# Patient Record
Sex: MALE | Race: WHITE | Employment: FULL TIME | URBAN - METROPOLITAN AREA
[De-identification: names, ages, dates, MRNs, and addresses within clinical notes are randomized per-mention and may not be internally consistent; named-entity substitution may affect disease eponyms.]

---

## 2021-02-04 ENCOUNTER — APPOINTMENT (OUTPATIENT)
Dept: GENERAL RADIOLOGY | Age: 46
End: 2021-02-04
Payer: COMMERCIAL

## 2021-02-04 ENCOUNTER — HOSPITAL ENCOUNTER (EMERGENCY)
Age: 46
Discharge: HOME | End: 2021-02-04
Attending: EMERGENCY MEDICINE
Payer: COMMERCIAL

## 2021-02-04 VITALS
TEMPERATURE: 98.4 F | OXYGEN SATURATION: 95 % | SYSTOLIC BLOOD PRESSURE: 141 MMHG | DIASTOLIC BLOOD PRESSURE: 72 MMHG | RESPIRATION RATE: 19 BRPM | BODY MASS INDEX: 31.83 KG/M2 | HEIGHT: 68 IN | HEART RATE: 82 BPM | WEIGHT: 210 LBS

## 2021-02-04 DIAGNOSIS — R55 NEAR SYNCOPE: ICD-10-CM

## 2021-02-04 DIAGNOSIS — R07.9 CHEST PAIN, UNSPECIFIED TYPE: ICD-10-CM

## 2021-02-04 DIAGNOSIS — J18.9 PNEUMONIA OF BOTH LUNGS DUE TO INFECTIOUS ORGANISM, UNSPECIFIED PART OF LUNG: Primary | ICD-10-CM

## 2021-02-04 LAB
ANION GAP SERPL CALCULATED.3IONS-SCNC: 13 MMOL/L (ref 7–16)
BUN BLDV-MCNC: 15 MG/DL (ref 6–20)
CALCIUM SERPL-MCNC: 8.9 MG/DL (ref 8.6–10.2)
CHLORIDE BLD-SCNC: 103 MMOL/L (ref 98–107)
CO2: 25 MMOL/L (ref 22–29)
CREAT SERPL-MCNC: 0.8 MG/DL (ref 0.7–1.2)
D DIMER: <200 NG/ML DDU
EKG ATRIAL RATE: 82 BPM
EKG P AXIS: 59 DEGREES
EKG P-R INTERVAL: 152 MS
EKG Q-T INTERVAL: 354 MS
EKG QRS DURATION: 86 MS
EKG QTC CALCULATION (BAZETT): 413 MS
EKG R AXIS: 21 DEGREES
EKG T AXIS: 35 DEGREES
EKG VENTRICULAR RATE: 82 BPM
GFR AFRICAN AMERICAN: >60
GFR NON-AFRICAN AMERICAN: >60 ML/MIN/1.73
GLUCOSE BLD-MCNC: 93 MG/DL (ref 74–99)
HCT VFR BLD CALC: 44.3 % (ref 37–54)
HEMOGLOBIN: 15.4 G/DL (ref 12.5–16.5)
MCH RBC QN AUTO: 30.1 PG (ref 26–35)
MCHC RBC AUTO-ENTMCNC: 34.8 % (ref 32–34.5)
MCV RBC AUTO: 86.7 FL (ref 80–99.9)
PDW BLD-RTO: 11 FL (ref 11.5–15)
PLATELET # BLD: 215 E9/L (ref 130–450)
PMV BLD AUTO: 11.4 FL (ref 7–12)
POTASSIUM SERPL-SCNC: 3.6 MMOL/L (ref 3.5–5)
RBC # BLD: 5.11 E12/L (ref 3.8–5.8)
SARS-COV-2, NAAT: NOT DETECTED
SODIUM BLD-SCNC: 141 MMOL/L (ref 132–146)
TROPONIN: <0.01 NG/ML (ref 0–0.03)
WBC # BLD: 7.5 E9/L (ref 4.5–11.5)

## 2021-02-04 PROCEDURE — 84484 ASSAY OF TROPONIN QUANT: CPT

## 2021-02-04 PROCEDURE — 85027 COMPLETE CBC AUTOMATED: CPT

## 2021-02-04 PROCEDURE — 85378 FIBRIN DEGRADE SEMIQUANT: CPT

## 2021-02-04 PROCEDURE — 99283 EMERGENCY DEPT VISIT LOW MDM: CPT

## 2021-02-04 PROCEDURE — U0002 COVID-19 LAB TEST NON-CDC: HCPCS

## 2021-02-04 PROCEDURE — 6370000000 HC RX 637 (ALT 250 FOR IP): Performed by: EMERGENCY MEDICINE

## 2021-02-04 PROCEDURE — 71045 X-RAY EXAM CHEST 1 VIEW: CPT

## 2021-02-04 PROCEDURE — 80048 BASIC METABOLIC PNL TOTAL CA: CPT

## 2021-02-04 PROCEDURE — 36415 COLL VENOUS BLD VENIPUNCTURE: CPT

## 2021-02-04 PROCEDURE — 93005 ELECTROCARDIOGRAM TRACING: CPT | Performed by: EMERGENCY MEDICINE

## 2021-02-04 RX ORDER — AMOXICILLIN 500 MG/1
500 CAPSULE ORAL EVERY 6 HOURS
COMMUNITY

## 2021-02-04 RX ORDER — VALSARTAN 80 MG/1
80 TABLET ORAL DAILY
COMMUNITY

## 2021-02-04 RX ORDER — AZITHROMYCIN 250 MG/1
TABLET, FILM COATED ORAL
Qty: 1 PACKET | Refills: 0 | Status: SHIPPED | OUTPATIENT
Start: 2021-02-04 | End: 2021-02-14

## 2021-02-04 RX ORDER — AZITHROMYCIN 250 MG/1
500 TABLET, FILM COATED ORAL ONCE
Status: COMPLETED | OUTPATIENT
Start: 2021-02-04 | End: 2021-02-04

## 2021-02-04 RX ADMIN — AZITHROMYCIN MONOHYDRATE 500 MG: 250 TABLET ORAL at 04:13

## 2021-02-04 ASSESSMENT — PAIN DESCRIPTION - ONSET: ONSET: ON-GOING

## 2021-02-04 ASSESSMENT — ENCOUNTER SYMPTOMS
DIARRHEA: 0
COUGH: 1
EYE REDNESS: 0
SHORTNESS OF BREATH: 1
BACK PAIN: 0
SORE THROAT: 0
NAUSEA: 0
EYE DISCHARGE: 0
ABDOMINAL PAIN: 0
EYE PAIN: 0
VOMITING: 0
WHEEZING: 0
SINUS PRESSURE: 0

## 2021-02-04 ASSESSMENT — PAIN DESCRIPTION - FREQUENCY: FREQUENCY: CONTINUOUS

## 2021-02-04 ASSESSMENT — PAIN DESCRIPTION - ORIENTATION: ORIENTATION: LEFT;MID

## 2021-02-04 ASSESSMENT — HEART SCORE: ECG: 0

## 2021-02-04 ASSESSMENT — PAIN DESCRIPTION - PAIN TYPE: TYPE: ACUTE PAIN

## 2021-02-04 NOTE — ED PROVIDER NOTES
abdominal tenderness. There is no guarding. Musculoskeletal: Normal range of motion. Skin:     General: Skin is warm and dry. Neurological:      Mental Status: He is alert and oriented to person, place, and time. Procedures     MDM           EKG:  Normal sinus rhythm with ventricular rate of 82. NE interval, QRS duration and QT interval within normal range. Normal axis. No ST segment abnormalities to suggest acute ischemia. Time: 6693. Re-evaluation. Patients symptoms are improving  Repeat physical examination is not changed  Patient is feeling much better. --------------------------------------------- PAST HISTORY ---------------------------------------------  Past Medical History:  has a past medical history of Asthma and Hypertension. Past Surgical History:  has a past surgical history that includes knee surgery and hernia repair. Social History:  reports that he has been smoking. He has been smoking about 0.50 packs per day. He has never used smokeless tobacco. He reports current alcohol use. He reports that he does not use drugs. Family History: family history is not on file. The patients home medications have been reviewed. Allergies: Patient has no known allergies.     -------------------------------------------------- RESULTS -------------------------------------------------  Labs:  Results for orders placed or performed during the hospital encounter of 62/23/38   Basic metabolic panel   Result Value Ref Range    Sodium 141 132 - 146 mmol/L    Potassium 3.6 3.5 - 5.0 mmol/L    Chloride 103 98 - 107 mmol/L    CO2 25 22 - 29 mmol/L    Anion Gap 13 7 - 16 mmol/L    Glucose 93 74 - 99 mg/dL    BUN 15 6 - 20 mg/dL    CREATININE 0.8 0.7 - 1.2 mg/dL    GFR Non-African American >60 >=60 mL/min/1.73    GFR African American >60     Calcium 8.9 8.6 - 10.2 mg/dL   CBC   Result Value Ref Range    WBC 7.5 4.5 - 11.5 E9/L    RBC 5.11 3.80 - 5.80 E12/L    Hemoglobin 15.4 12.5 The plan has been discussed in detail and they are aware of the specific conditions for emergent return, as well as the importance of follow-up. New Prescriptions    AZITHROMYCIN (ZITHROMAX Z-MAURISIO) 250 MG TABLET    TAKE 500MG PO DAY ONE. .. 250MG PO DAY TWO THROUGH FIVE   DISPENSE 6 TABS  NO REFILLS       Diagnosis:  1. Pneumonia of both lungs due to infectious organism, unspecified part of lung    2. Chest pain, unspecified type    3. Near syncope        Disposition:  Patient's disposition: Discharge to home  Patient's condition is stable.          Yolande Estrada DO  02/04/21 5453

## 2021-02-04 NOTE — ED NOTES
Blood specimens obtained and sent. NAD noted. Will continue to monitor.         Kathy Caba RN  02/04/21 0643

## 2023-07-14 ENCOUNTER — APPOINTMENT (OUTPATIENT)
Dept: GENERAL RADIOLOGY | Age: 48
End: 2023-07-14
Payer: COMMERCIAL

## 2023-07-14 ENCOUNTER — HOSPITAL ENCOUNTER (EMERGENCY)
Age: 48
Discharge: HOME | End: 2023-07-14
Payer: COMMERCIAL

## 2023-07-14 VITALS
WEIGHT: 210 LBS | RESPIRATION RATE: 20 BRPM | DIASTOLIC BLOOD PRESSURE: 86 MMHG | HEART RATE: 90 BPM | BODY MASS INDEX: 31.93 KG/M2 | SYSTOLIC BLOOD PRESSURE: 141 MMHG | OXYGEN SATURATION: 99 % | TEMPERATURE: 98.4 F

## 2023-07-14 DIAGNOSIS — S83.411A SPRAIN OF MEDIAL COLLATERAL LIGAMENT OF RIGHT KNEE, INITIAL ENCOUNTER: Primary | ICD-10-CM

## 2023-07-14 DIAGNOSIS — M23.91 INTERNAL DERANGEMENT OF RIGHT KNEE: ICD-10-CM

## 2023-07-14 PROCEDURE — 6370000000 HC RX 637 (ALT 250 FOR IP): Performed by: PHYSICIAN ASSISTANT

## 2023-07-14 PROCEDURE — 73562 X-RAY EXAM OF KNEE 3: CPT

## 2023-07-14 PROCEDURE — 99283 EMERGENCY DEPT VISIT LOW MDM: CPT

## 2023-07-14 RX ORDER — IBUPROFEN 600 MG/1
600 TABLET ORAL ONCE
Status: COMPLETED | OUTPATIENT
Start: 2023-07-14 | End: 2023-07-14

## 2023-07-14 RX ADMIN — IBUPROFEN 600 MG: 600 TABLET, FILM COATED ORAL at 13:55

## 2023-07-14 ASSESSMENT — PAIN DESCRIPTION - ORIENTATION: ORIENTATION: RIGHT

## 2023-07-14 ASSESSMENT — PAIN SCALES - GENERAL: PAINLEVEL_OUTOF10: 2

## 2023-07-14 ASSESSMENT — LIFESTYLE VARIABLES: HOW OFTEN DO YOU HAVE A DRINK CONTAINING ALCOHOL: 4 OR MORE TIMES A WEEK

## 2023-07-14 ASSESSMENT — PAIN DESCRIPTION - LOCATION: LOCATION: KNEE

## 2023-07-14 ASSESSMENT — PAIN - FUNCTIONAL ASSESSMENT: PAIN_FUNCTIONAL_ASSESSMENT: 0-10

## 2023-07-14 NOTE — ED NOTES
Patient discharged, all questions answered, all medications and pain management discussed. Patient will return if any problems arise.         Zeynep York RN  07/14/23 6779

## 2023-07-14 NOTE — ED PROVIDER NOTES
Independent DAYAMI Visit. [facility],6[address] Floor  ED  Encounter Note  Admit Date/RoomTime: 2023  1:40 PM  ED Room: Internal Waiting/IntWait  NAME: Eliezer Kincaid  : 1975  MRN: [id number]  PCP: [id number]     CHIEF COMPLAINT     Knee Pain (Hx x 1 day/ felt pop in rt knee yesterday)    HISTORY OF PRESENT ILLNESS        Eliezer Kincaid is a 46 y.o. male who presents to the ED by private vehicle for right knee pain, beginning a few day(s) ago. The complaint has been persistent and are moderate in severity. The patient states that he generally plays basketball fairly routinely. He reports that a few months ago he started feeling some tightness and discomfort in his right knee while playing. He states that he took a few months off to sort of rest up and allow his knee to improve. Yesterday he returned to playing basketball and states that he felt this tearing or pulling sensation in the right knee. He continued to play and when he came home last night he put ice on his knee with and took some Tylenol and anti-inflammatories. The pain persisted overnight. He describes minimal pain at rest but pretty significant pain with movement of the right knee joint and with ambulation and weightbearing. He feels most of the pain along the medial surface of the joint. There is some sense that the knee may give way or buckle    REVIEW OF SYSTEMS     Pertinent positives and negatives are stated within HPI, all other systems reviewed and are negative. Past Medical History:  has a past medical history of Asthma and Hypertension. Surgical History:  has a past surgical history that includes knee surgery; hernia repair; and Knee arthroscopy. Social History:  reports that he has been smoking cigarettes. He has been smoking an average of .5 packs per day. He has never used smokeless tobacco. He reports current alcohol use.  He reports that he does not use

## 2023-07-17 ENCOUNTER — TELEPHONE (OUTPATIENT)
Dept: ORTHOPEDIC SURGERY | Age: 48
End: 2023-07-17

## 2023-07-17 NOTE — TELEPHONE ENCOUNTER
Pt called to schedule a [facility] ED f/u from 7/14 with Dr Jeffrey Al for a rt knee injury. No appts are available until the end of the week. Pt states he was given a RTW date of today. He states his job requires him to stand all day and he will not be able to perform his job.   Please call pt with recommendations [phone]

## 2023-07-18 ENCOUNTER — OFFICE VISIT (OUTPATIENT)
Dept: ORTHOPEDIC SURGERY | Age: 48
End: 2023-07-18

## 2023-07-18 VITALS — WEIGHT: 211 LBS | HEIGHT: 68 IN | BODY MASS INDEX: 31.98 KG/M2 | TEMPERATURE: 98 F

## 2023-07-18 DIAGNOSIS — S83.206A TEAR OF MENISCUS OF RIGHT KNEE, UNSPECIFIED MENISCUS, UNSPECIFIED TEAR TYPE, UNSPECIFIED WHETHER OLD OR CURRENT TEAR: Primary | ICD-10-CM

## 2023-07-18 DIAGNOSIS — M23.51 RECURRENT RIGHT KNEE INSTABILITY: ICD-10-CM

## 2023-07-18 RX ORDER — NAPROXEN 500 MG/1
500 TABLET ORAL 2 TIMES DAILY WITH MEALS
Qty: 60 TABLET | Refills: 0 | Status: SHIPPED | OUTPATIENT
Start: 2023-07-18 | End: 2023-08-17

## 2023-07-18 RX ORDER — OMEPRAZOLE 20 MG/1
20 CAPSULE, DELAYED RELEASE ORAL
Qty: 60 CAPSULE | Refills: 0 | Status: SHIPPED | OUTPATIENT
Start: 2023-07-18

## 2023-07-18 NOTE — PROGRESS NOTES
KNEE RIGHT WO CONTRAST; Future    Recurrent right knee instability    Other orders  -     naproxen (NAPROSYN) 500 MG tablet; Take 1 tablet by mouth 2 times daily (with meals)  -     omeprazole (PRILOSEC) 20 MG delayed release capsule; Take 1 capsule by mouth 2 times daily (before meals)        Patient seen and examined. X-rays reviewed. Patient sustained a noncontact twisting injury to the right knee. Patient's main complaint is instability of symptomatic knee. Exam and history is consistent with possible meniscus tear and other possible internal derangement such as ACL tear, patellar dislocation, loose body. MRI recommended for further evaluation management. Follow-up after MRI.          Bharti Toro, DO  7/18/23

## 2023-07-26 ENCOUNTER — HOSPITAL ENCOUNTER (OUTPATIENT)
Dept: MRI IMAGING | Age: 48
Discharge: HOME | End: 2023-07-28
Payer: COMMERCIAL

## 2023-07-26 DIAGNOSIS — S83.206A TEAR OF MENISCUS OF RIGHT KNEE, UNSPECIFIED MENISCUS, UNSPECIFIED TEAR TYPE, UNSPECIFIED WHETHER OLD OR CURRENT TEAR: ICD-10-CM

## 2023-07-26 PROCEDURE — 73721 MRI JNT OF LWR EXTRE W/O DYE: CPT

## 2023-07-27 ENCOUNTER — TELEPHONE (OUTPATIENT)
Dept: ORTHOPEDIC SURGERY | Age: 48
End: 2023-07-27

## 2023-07-27 NOTE — TELEPHONE ENCOUNTER
Pt called in to [facility] an appt for MRI results. I offered first avail on 8/2/23. He said this is time sensitive, he needs to let his employer know what's going on. Is the pt able to come in before first avail? Please, advise. Harry Juarez can be reached at [phone]. Thank you.

## 2023-07-28 ENCOUNTER — OFFICE VISIT (OUTPATIENT)
Dept: ORTHOPEDIC SURGERY | Age: 48
End: 2023-07-28

## 2023-07-28 VITALS — TEMPERATURE: 98 F | BODY MASS INDEX: 31.98 KG/M2 | WEIGHT: 211 LBS | HEIGHT: 68 IN

## 2023-07-28 DIAGNOSIS — S83.206A TEAR OF MENISCUS OF RIGHT KNEE, UNSPECIFIED MENISCUS, UNSPECIFIED TEAR TYPE, UNSPECIFIED WHETHER OLD OR CURRENT TEAR: Primary | ICD-10-CM

## 2023-07-28 DIAGNOSIS — M23.51 RECURRENT RIGHT KNEE INSTABILITY: ICD-10-CM

## 2023-07-28 NOTE — PROGRESS NOTES
of intravenous contrast. COMPARISON: Radiographs 07/14/2023 HISTORY: ORDERING SYSTEM PROVIDED HISTORY: Tear of meniscus of right knee, unspecified meniscus, unspecified tear type, unspecified whether old or current tear FINDINGS: MENISCI: Complex tear in the posterior horn medial meniscus with vertical component. No significant peripheral extrusion. Intact lateral meniscus with some mucoid degeneration in the anterior horn. CRUCIATE LIGAMENTS: Intact anterior cruciate and posterior cruciate ligaments. EXTENSOR MECHANISM: Intact quadriceps and patellar tendons. Intact patellar retinacula. LATERAL COLLATERAL LIGAMENT COMPLEX: Intact IT band, lateral collateral ligament proper, biceps femoris tendon and popliteus tendon. MEDIAL COLLATERAL LIGAMENT COMPLEX: The superficial and deep components of the medial collateral ligament are intact. KNEE JOINT: Small knee joint effusion. No popliteal cyst.  No high-grade chondromalacia. Mild tricompartmental DJD greatest in the patellofemoral compartment. BONE MARROW: No evidence of fracture. Normal marrow signal.     1. Complex tear of the posterior horn medial meniscus with vertical component but no significant peripheral extrusion. 2. Mucoid degeneration in the anterior horn lateral meniscus. 3. Small knee joint effusion. 4. Mild tricompartmental DJD. Saeedrenee Late was seen today for knee pain. Diagnoses and all orders for this visit:    Tear of meniscus of right knee, unspecified meniscus, unspecified tear type, unspecified whether old or current tear    Recurrent right knee instability        Patient seen and examined. X-rays reviewed. Patient sustained a noncontact twisting injury to the right knee. Patient's main complaint is instability of symptomatic knee. Exam and history is consistent with possible meniscus tear and other possible internal derangement such as ACL tear, patellar dislocation, loose body. MRI recommended for further evaluation management.

## 2023-08-02 ENCOUNTER — TELEPHONE (OUTPATIENT)
Dept: ORTHOPEDIC SURGERY | Age: 48
End: 2023-08-02

## 2023-08-02 NOTE — TELEPHONE ENCOUNTER
LOV 7/28/23 Dr Florinda Bateman next OV 8/11/23. Patient called in complaining of RT knee swelling x 3 days. Patient wanting to speak with clinic for further recommendations.  Please advise patient    [phone]

## 2023-08-09 NOTE — PROGRESS NOTES
performed without the administration of intravenous contrast. COMPARISON: Radiographs 07/14/2023 HISTORY: ORDERING SYSTEM PROVIDED HISTORY: Tear of meniscus of right knee, unspecified meniscus, unspecified tear type, unspecified whether old or current tear FINDINGS: MENISCI: Complex tear in the posterior horn medial meniscus with vertical component. No significant peripheral extrusion. Intact lateral meniscus with some mucoid degeneration in the anterior horn. CRUCIATE LIGAMENTS: Intact anterior cruciate and posterior cruciate ligaments. EXTENSOR MECHANISM: Intact quadriceps and patellar tendons. Intact patellar retinacula. LATERAL COLLATERAL LIGAMENT COMPLEX: Intact IT band, lateral collateral ligament proper, biceps femoris tendon and popliteus tendon. MEDIAL COLLATERAL LIGAMENT COMPLEX: The superficial and deep components of the medial collateral ligament are intact. KNEE JOINT: Small knee joint effusion. No popliteal cyst.  No high-grade chondromalacia. Mild tricompartmental DJD greatest in the patellofemoral compartment. BONE MARROW: No evidence of fracture. Normal marrow signal.     1. Complex tear of the posterior horn medial meniscus with vertical component but no significant peripheral extrusion. 2. Mucoid degeneration in the anterior horn lateral meniscus. 3. Small knee joint effusion. 4. Mild tricompartmental DJD. Hattie Guillen was seen today for knee pain. Diagnoses and all orders for this visit:    Tear of meniscus of right knee, unspecified meniscus, unspecified tear type, unspecified whether old or current tear    Recurrent right knee instability    Exercise counseling        Patient seen and examined. X-rays reviewed. Patient sustained a noncontact twisting injury to the right knee. Patient's main complaint is instability of symptomatic knee.   Exam and history is consistent with possible meniscus tear and other possible internal derangement such as ACL tear, patellar dislocation, loose

## 2023-08-11 ENCOUNTER — OFFICE VISIT (OUTPATIENT)
Dept: ORTHOPEDIC SURGERY | Age: 48
End: 2023-08-11

## 2023-08-11 ENCOUNTER — PREP FOR PROCEDURE (OUTPATIENT)
Dept: ORTHOPEDIC SURGERY | Age: 48
End: 2023-08-11

## 2023-08-11 ENCOUNTER — TELEPHONE (OUTPATIENT)
Dept: ORTHOPEDIC SURGERY | Age: 48
End: 2023-08-11

## 2023-08-11 VITALS — BODY MASS INDEX: 32.13 KG/M2 | HEIGHT: 68 IN | TEMPERATURE: 98 F | WEIGHT: 212 LBS

## 2023-08-11 DIAGNOSIS — S83.206A TEAR OF MENISCUS OF RIGHT KNEE, UNSPECIFIED MENISCUS, UNSPECIFIED TEAR TYPE, UNSPECIFIED WHETHER OLD OR CURRENT TEAR: Primary | ICD-10-CM

## 2023-08-11 DIAGNOSIS — Z71.82 EXERCISE COUNSELING: ICD-10-CM

## 2023-08-11 DIAGNOSIS — M23.51 RECURRENT RIGHT KNEE INSTABILITY: ICD-10-CM

## 2023-08-11 RX ORDER — OMEPRAZOLE 20 MG/1
20 CAPSULE, DELAYED RELEASE ORAL
Qty: 60 CAPSULE | Refills: 0 | OUTPATIENT
Start: 2023-08-11

## 2023-08-11 NOTE — PATIENT INSTRUCTIONS
about what sports or other exercise you can do. Ice    To reduce swelling and pain, put ice or a cold pack on your knee for 10 to 20 minutes at a time. Do this every 1 to 2 hours. Put a thin cloth between the ice and your skin. Follow-up care is a key part of your treatment and safety. Be sure to make and go to all appointments, and call your doctor if you are having problems. It's also a good idea to know your test results and keep a list of the medicines you take. When should you call for help? Call 911 anytime you think you may need emergency care. For example, call if:    You passed out (lost consciousness). You have severe trouble breathing. You have sudden chest pain and shortness of breath, or you cough up blood. Call your doctor now or seek immediate medical care if:    Your foot or toes are numb or tingling. Your foot is cool or pale, or it changes color. You have signs of a blood clot, such as:  Pain in your calf, back of the knee, thigh, or groin. Redness and swelling in your leg or groin. You are sick to your stomach or cannot keep fluids down. You have pain that does not get better after you take pain medicine. You have loose stitches, or your incision comes open. Bright red blood has soaked through the bandage over your incision. You have signs of infection, such as: Increased pain, swelling, warmth, or redness. Red streaks leading from the incisions. Pus draining from the incisions. A fever. Watch closely for any changes in your health, and be sure to contact your doctor if:    You do not have a bowel movement after taking a laxative. Where can you learn more? Go to [email or web address]://Restoriusirvin.Matchbook. org and sign in to your [employer] account. Enter G250 in the [facility] box to learn more about \"Knee Arthroscopy: What to Expect at Home. \"     If you do not have an account, please click on the \"Sign Up Now\" link.   Current as of:

## 2023-08-11 NOTE — TELEPHONE ENCOUNTER
Prior Authorization Form:      DEMOGRAPHICS:                     Patient Name:  Rocío Figueroa  Patient :  1975            Insurance:  Payor: Syd Jordan / Plan: Syd Jordan / Product Type: *No Product type* /   Insurance ID Number:    Payer/Plan Subscr  Sex Relation Sub. Ins. ID Effective Group Num   1.  [address] 1975 Male Self [id number]                                    [id number]         DIAGNOSIS & PROCEDURE:                       Procedure/Operation:  RIGHT KNEE ARTHROSCOPY MENISECTOMY AND DEBRIDEMENT           CPT Code: 97762    Diagnosis:  RIGHT KNEE MENISCUS TEAR    ICD10 Code: A05.799P    Location:  [address] [place] [address]    Surgeon:  DR Natalia Claude     SCHEDULING INFORMATION:                          Date:   23  Time:   TBD            Anesthesia:  General                                                       Status:  Outpatient        Special Comments:         Electronically signed by Jazzy Mazariegos MA on 2023 at 9:46 AM

## 2023-08-14 RX ORDER — NAPROXEN 500 MG/1
TABLET ORAL
Qty: 60 TABLET | Refills: 0 | Status: SHIPPED | OUTPATIENT
Start: 2023-08-14

## 2023-08-18 NOTE — PROGRESS NOTES
[address] [facility]                                                                                                                    PRE OP INSTRUCTIONS FOR  Eliezer Kincaid        Date: 8/18/2023    Date of surgery: 8/21/23   Arrival Time: Hospital will call you between 5pm and 7pm with your final arrival time for surgery    Do not eat or drink anything after midnight prior to surgery. This includes no water, chewing gum, mints or ice chips. Take the following medications with a small sip of water on the morning of Surgery: none; bring inhaler with you     Diabetics may take evening dose of insulin but none after midnight. If you feel symptomatic or low blood sugar morning of surgery drink 1-2 ounces of apple juice only. Aspirin, Ibuprofen, Advil, Naproxen, Vitamin E and other Anti-inflammatory products should be stopped  before surgery  as directed by your physician. Take Tylenol only unless instructed otherwise by your surgeon. Check with your Doctor regarding stopping Plavix, Coumadin, Lovenox, Eliquis, Effient, or other blood thinners. Do not smoke,use illicit drugs and do not drink any alcoholic beverages 24 hours prior to surgery. You may brush your teeth the morning of surgery. DO NOT SWALLOW WATER    You MUST make arrangements for a responsible adult to take you home after your surgery. You will not be allowed to leave alone or drive yourself home. It is strongly suggested someone stay with you the first 24 hrs. Your surgery will be cancelled if you do not have a ride home. PEDIATRIC PATIENTS ONLY:  A parent/legal guardian must accompany a child scheduled for surgery and plan to stay at the hospital until the child is discharged. Please do not bring other children with you.     Please wear simple, loose fitting clothing to the hospital.  Trisha Grooms not bring valuables (money, credit cards, checkbooks, etc.) Do not wear any makeup (including no eye makeup) or nail polish on

## 2023-08-19 NOTE — DISCHARGE INSTRUCTIONS
[facility] TRAUMA SERVICE  DISCHARGE INSTRUCTIONS TO HOME     ACTIVITY INSTRUCTIONS:    [x]Elevate operative/injured limb on 2 pillows at home   []Sling to operative/injured limb  [x]No heavy lifting, pushing, pulling or strenuous activity      []Use crutches  []Use  walker   []Use  wheelchair  [x]Weight bearing Status: None {Left/right:28614} extremity    []Other    WOUND/DRESSING INSTRUCTIONS:  [address] [facility] to perform the following wound care or dressing changes. Always ensure you and your care giver have clean hands before and after caring for the wound. []May shower after dressing has been removed     [x]May NOT shower until seen in office and sutures/staples removed  [x]Keep splint clean and dry, do not remove or get wet      [x]Keep dressing clean and dry            [x]Do not remove dressing     MEDICATION INSTRUCTIONS:  [x]Take pain medicine as directed. When taking pain medications, you may experience dizziness or drowsiness. Do not drink alcohol or drive when taking these medications. [x]Take blood thinner medications as directed and continue taking until instructed to stop by your Orthopaedic Surgeon. [x]Give the list of your medications to your primary care physician on your next visit. Keep your med list updated and carry it with you in case of emergency  Other Instructions:          FOLLOW-UP CARE:  [x]Follow up with your Orthopaedic Surgeon in 2 weeks. Call the office for directions or with any questions.

## 2023-08-21 ENCOUNTER — ANESTHESIA (OUTPATIENT)
Dept: OPERATING ROOM | Age: 48
End: 2023-08-21
Payer: COMMERCIAL

## 2023-08-21 ENCOUNTER — HOSPITAL ENCOUNTER (OUTPATIENT)
Age: 48
Setting detail: OUTPATIENT SURGERY
Discharge: HOME | End: 2023-08-21
Attending: ORTHOPAEDIC SURGERY | Admitting: ORTHOPAEDIC SURGERY
Payer: COMMERCIAL

## 2023-08-21 ENCOUNTER — ANESTHESIA EVENT (OUTPATIENT)
Dept: OPERATING ROOM | Age: 48
End: 2023-08-21
Payer: COMMERCIAL

## 2023-08-21 VITALS
WEIGHT: 219 LBS | TEMPERATURE: 96.7 F | HEART RATE: 69 BPM | SYSTOLIC BLOOD PRESSURE: 128 MMHG | DIASTOLIC BLOOD PRESSURE: 69 MMHG | HEIGHT: 68 IN | RESPIRATION RATE: 18 BRPM | OXYGEN SATURATION: 99 % | BODY MASS INDEX: 33.19 KG/M2

## 2023-08-21 DIAGNOSIS — S83.206A TEAR OF MENISCUS OF RIGHT KNEE AS CURRENT INJURY, UNSPECIFIED MENISCUS, UNSPECIFIED TEAR TYPE, INITIAL ENCOUNTER: ICD-10-CM

## 2023-08-21 DIAGNOSIS — Z98.890 S/P ARTHROSCOPY OF RIGHT KNEE: Primary | ICD-10-CM

## 2023-08-21 LAB
ANION GAP SERPL CALCULATED.3IONS-SCNC: 11 MMOL/L (ref 7–16)
BUN SERPL-MCNC: 14 MG/DL (ref 6–20)
CALCIUM SERPL-MCNC: 9.1 MG/DL (ref 8.6–10.2)
CHLORIDE SERPL-SCNC: 107 MMOL/L (ref 98–107)
CO2 SERPL-SCNC: 23 MMOL/L (ref 22–29)
CREAT SERPL-MCNC: 0.7 MG/DL (ref 0.7–1.2)
ERYTHROCYTE [DISTWIDTH] IN BLOOD BY AUTOMATED COUNT: 11.6 % (ref 11.5–15)
GFR SERPL CREATININE-BSD FRML MDRD: >60 ML/MIN/1.73M2
GLUCOSE SERPL-MCNC: 107 MG/DL (ref 74–99)
HCT VFR BLD AUTO: 43.3 % (ref 37–54)
HGB BLD-MCNC: 14.9 G/DL (ref 12.5–16.5)
MCH RBC QN AUTO: 30.7 PG (ref 26–35)
MCHC RBC AUTO-ENTMCNC: 34.4 G/DL (ref 32–34.5)
MCV RBC AUTO: 89.3 FL (ref 80–99.9)
PLATELET # BLD AUTO: 216 K/UL (ref 130–450)
PMV BLD AUTO: 11.8 FL (ref 7–12)
POTASSIUM SERPL-SCNC: 4.4 MMOL/L (ref 3.5–5)
RBC # BLD AUTO: 4.85 M/UL (ref 3.8–5.8)
SODIUM SERPL-SCNC: 141 MMOL/L (ref 132–146)
WBC OTHER # BLD: 5 K/UL (ref 4.5–11.5)

## 2023-08-21 PROCEDURE — 3600000003 HC SURGERY LEVEL 3 BASE: Performed by: ORTHOPAEDIC SURGERY

## 2023-08-21 PROCEDURE — 3700000000 HC ANESTHESIA ATTENDED CARE: Performed by: ORTHOPAEDIC SURGERY

## 2023-08-21 PROCEDURE — 2580000003 HC RX 258: Performed by: ORTHOPAEDIC SURGERY

## 2023-08-21 PROCEDURE — 2500000003 HC RX 250 WO HCPCS: Performed by: NURSE ANESTHETIST, CERTIFIED REGISTERED

## 2023-08-21 PROCEDURE — 2580000003 HC RX 258: Performed by: STUDENT IN AN ORGANIZED HEALTH CARE EDUCATION/TRAINING PROGRAM

## 2023-08-21 PROCEDURE — 6360000002 HC RX W HCPCS: Performed by: NURSE ANESTHETIST, CERTIFIED REGISTERED

## 2023-08-21 PROCEDURE — 7100000011 HC PHASE II RECOVERY - ADDTL 15 MIN: Performed by: ORTHOPAEDIC SURGERY

## 2023-08-21 PROCEDURE — 7100000000 HC PACU RECOVERY - FIRST 15 MIN: Performed by: ORTHOPAEDIC SURGERY

## 2023-08-21 PROCEDURE — 6360000002 HC RX W HCPCS

## 2023-08-21 PROCEDURE — 3600000013 HC SURGERY LEVEL 3 ADDTL 15MIN: Performed by: ORTHOPAEDIC SURGERY

## 2023-08-21 PROCEDURE — 7100000001 HC PACU RECOVERY - ADDTL 15 MIN: Performed by: ORTHOPAEDIC SURGERY

## 2023-08-21 PROCEDURE — 29876 ARTHRS KNEE SURG SYNVCT MAJ: CPT | Performed by: ORTHOPAEDIC SURGERY

## 2023-08-21 PROCEDURE — 6360000002 HC RX W HCPCS: Performed by: ORTHOPAEDIC SURGERY

## 2023-08-21 PROCEDURE — 7100000010 HC PHASE II RECOVERY - FIRST 15 MIN: Performed by: ORTHOPAEDIC SURGERY

## 2023-08-21 PROCEDURE — 6370000000 HC RX 637 (ALT 250 FOR IP): Performed by: STUDENT IN AN ORGANIZED HEALTH CARE EDUCATION/TRAINING PROGRAM

## 2023-08-21 PROCEDURE — 3700000001 HC ADD 15 MINUTES (ANESTHESIA): Performed by: ORTHOPAEDIC SURGERY

## 2023-08-21 PROCEDURE — 2709999900 HC NON-CHARGEABLE SUPPLY: Performed by: ORTHOPAEDIC SURGERY

## 2023-08-21 PROCEDURE — 85027 COMPLETE CBC AUTOMATED: CPT

## 2023-08-21 PROCEDURE — 2580000003 HC RX 258: Performed by: NURSE ANESTHETIST, CERTIFIED REGISTERED

## 2023-08-21 PROCEDURE — 80048 BASIC METABOLIC PNL TOTAL CA: CPT

## 2023-08-21 PROCEDURE — 94640 AIRWAY INHALATION TREATMENT: CPT

## 2023-08-21 PROCEDURE — 6360000002 HC RX W HCPCS: Performed by: STUDENT IN AN ORGANIZED HEALTH CARE EDUCATION/TRAINING PROGRAM

## 2023-08-21 PROCEDURE — 2720000010 HC SURG SUPPLY STERILE: Performed by: ORTHOPAEDIC SURGERY

## 2023-08-21 RX ORDER — MIDAZOLAM HYDROCHLORIDE 1 MG/ML
INJECTION INTRAMUSCULAR; INTRAVENOUS PRN
Status: DISCONTINUED | OUTPATIENT
Start: 2023-08-21 | End: 2023-08-21 | Stop reason: SDUPTHER

## 2023-08-21 RX ORDER — LIDOCAINE HYDROCHLORIDE 20 MG/ML
INJECTION, SOLUTION INTRAVENOUS PRN
Status: DISCONTINUED | OUTPATIENT
Start: 2023-08-21 | End: 2023-08-21 | Stop reason: SDUPTHER

## 2023-08-21 RX ORDER — FENTANYL CITRATE 50 UG/ML
INJECTION, SOLUTION INTRAMUSCULAR; INTRAVENOUS PRN
Status: DISCONTINUED | OUTPATIENT
Start: 2023-08-21 | End: 2023-08-21 | Stop reason: SDUPTHER

## 2023-08-21 RX ORDER — DOCUSATE SODIUM 100 MG/1
100 CAPSULE, LIQUID FILLED ORAL 2 TIMES DAILY PRN
Qty: 40 CAPSULE | Refills: 1 | Status: SHIPPED | OUTPATIENT
Start: 2023-08-21

## 2023-08-21 RX ORDER — ASPIRIN 325 MG
325 TABLET ORAL DAILY
Qty: 14 TABLET | Refills: 0 | Status: SHIPPED | OUTPATIENT
Start: 2023-08-21

## 2023-08-21 RX ORDER — DEXAMETHASONE SODIUM PHOSPHATE 10 MG/ML
INJECTION, SOLUTION INTRAMUSCULAR; INTRAVENOUS PRN
Status: DISCONTINUED | OUTPATIENT
Start: 2023-08-21 | End: 2023-08-21 | Stop reason: SDUPTHER

## 2023-08-21 RX ORDER — SODIUM CHLORIDE, SODIUM LACTATE, POTASSIUM CHLORIDE, CALCIUM CHLORIDE 600; 310; 30; 20 MG/100ML; MG/100ML; MG/100ML; MG/100ML
INJECTION, SOLUTION INTRAVENOUS CONTINUOUS PRN
Status: DISCONTINUED | OUTPATIENT
Start: 2023-08-21 | End: 2023-08-21 | Stop reason: SDUPTHER

## 2023-08-21 RX ORDER — HYDRALAZINE HYDROCHLORIDE 20 MG/ML
INJECTION INTRAMUSCULAR; INTRAVENOUS
Status: COMPLETED
Start: 2023-08-21 | End: 2023-08-21

## 2023-08-21 RX ORDER — ONDANSETRON 2 MG/ML
INJECTION INTRAMUSCULAR; INTRAVENOUS PRN
Status: DISCONTINUED | OUTPATIENT
Start: 2023-08-21 | End: 2023-08-21 | Stop reason: SDUPTHER

## 2023-08-21 RX ORDER — HYDRALAZINE HYDROCHLORIDE 20 MG/ML
5 INJECTION INTRAMUSCULAR; INTRAVENOUS EVERY 10 MIN PRN
Status: DISCONTINUED | OUTPATIENT
Start: 2023-08-21 | End: 2023-08-21 | Stop reason: HOSPADM

## 2023-08-21 RX ORDER — IPRATROPIUM BROMIDE AND ALBUTEROL SULFATE 2.5; .5 MG/3ML; MG/3ML
1 SOLUTION RESPIRATORY (INHALATION) ONCE
Status: COMPLETED | OUTPATIENT
Start: 2023-08-21 | End: 2023-08-21

## 2023-08-21 RX ORDER — SODIUM CHLORIDE 0.9 % (FLUSH) 0.9 %
5-40 SYRINGE (ML) INJECTION EVERY 12 HOURS SCHEDULED
Status: DISCONTINUED | OUTPATIENT
Start: 2023-08-21 | End: 2023-08-21 | Stop reason: HOSPADM

## 2023-08-21 RX ORDER — SODIUM CHLORIDE, SODIUM LACTATE, POTASSIUM CHLORIDE, CALCIUM CHLORIDE 600; 310; 30; 20 MG/100ML; MG/100ML; MG/100ML; MG/100ML
INJECTION, SOLUTION INTRAVENOUS CONTINUOUS
Status: DISCONTINUED | OUTPATIENT
Start: 2023-08-21 | End: 2023-08-21 | Stop reason: HOSPADM

## 2023-08-21 RX ORDER — OXYCODONE HYDROCHLORIDE AND ACETAMINOPHEN 5; 325 MG/1; MG/1
1 TABLET ORAL ONCE
Status: COMPLETED | OUTPATIENT
Start: 2023-08-21 | End: 2023-08-21

## 2023-08-21 RX ORDER — OXYCODONE HYDROCHLORIDE AND ACETAMINOPHEN 5; 325 MG/1; MG/1
1 TABLET ORAL EVERY 6 HOURS PRN
Qty: 28 TABLET | Refills: 0 | Status: SHIPPED | OUTPATIENT
Start: 2023-08-21 | End: 2023-08-28

## 2023-08-21 RX ORDER — PROPOFOL 10 MG/ML
INJECTION, EMULSION INTRAVENOUS PRN
Status: DISCONTINUED | OUTPATIENT
Start: 2023-08-21 | End: 2023-08-21 | Stop reason: SDUPTHER

## 2023-08-21 RX ORDER — PROCHLORPERAZINE EDISYLATE 5 MG/ML
5 INJECTION INTRAMUSCULAR; INTRAVENOUS
Status: DISCONTINUED | OUTPATIENT
Start: 2023-08-21 | End: 2023-08-21 | Stop reason: HOSPADM

## 2023-08-21 RX ORDER — HYDROMORPHONE HYDROCHLORIDE 1 MG/ML
0.5 INJECTION, SOLUTION INTRAMUSCULAR; INTRAVENOUS; SUBCUTANEOUS EVERY 5 MIN PRN
Status: DISCONTINUED | OUTPATIENT
Start: 2023-08-21 | End: 2023-08-21 | Stop reason: HOSPADM

## 2023-08-21 RX ORDER — GLYCOPYRROLATE 1 MG/5 ML
SYRINGE (ML) INTRAVENOUS PRN
Status: DISCONTINUED | OUTPATIENT
Start: 2023-08-21 | End: 2023-08-21 | Stop reason: SDUPTHER

## 2023-08-21 RX ORDER — BUPIVACAINE HYDROCHLORIDE 2.5 MG/ML
INJECTION, SOLUTION EPIDURAL; INFILTRATION; INTRACAUDAL PRN
Status: DISCONTINUED | OUTPATIENT
Start: 2023-08-21 | End: 2023-08-21 | Stop reason: ALTCHOICE

## 2023-08-21 RX ORDER — KETAMINE HCL IN NACL, ISO-OSM 100MG/10ML
SYRINGE (ML) INJECTION PRN
Status: DISCONTINUED | OUTPATIENT
Start: 2023-08-21 | End: 2023-08-21 | Stop reason: SDUPTHER

## 2023-08-21 RX ORDER — SODIUM CHLORIDE 0.9 % (FLUSH) 0.9 %
5-40 SYRINGE (ML) INJECTION PRN
Status: DISCONTINUED | OUTPATIENT
Start: 2023-08-21 | End: 2023-08-21 | Stop reason: HOSPADM

## 2023-08-21 RX ORDER — ONDANSETRON 4 MG/1
4 TABLET, FILM COATED ORAL EVERY 6 HOURS PRN
Qty: 20 TABLET | Refills: 1 | Status: SHIPPED | OUTPATIENT
Start: 2023-08-21

## 2023-08-21 RX ORDER — KETOROLAC TROMETHAMINE 30 MG/ML
INJECTION, SOLUTION INTRAMUSCULAR; INTRAVENOUS
Status: DISCONTINUED
Start: 2023-08-21 | End: 2023-08-21 | Stop reason: WASHOUT

## 2023-08-21 RX ORDER — HYDROMORPHONE HYDROCHLORIDE 1 MG/ML
0.25 INJECTION, SOLUTION INTRAMUSCULAR; INTRAVENOUS; SUBCUTANEOUS EVERY 5 MIN PRN
Status: COMPLETED | OUTPATIENT
Start: 2023-08-21 | End: 2023-08-21

## 2023-08-21 RX ORDER — SODIUM CHLORIDE 9 MG/ML
INJECTION, SOLUTION INTRAVENOUS PRN
Status: DISCONTINUED | OUTPATIENT
Start: 2023-08-21 | End: 2023-08-21 | Stop reason: HOSPADM

## 2023-08-21 RX ADMIN — LIDOCAINE HYDROCHLORIDE 80 MG: 20 INJECTION, SOLUTION INTRAVENOUS at 09:52

## 2023-08-21 RX ADMIN — SODIUM CHLORIDE, POTASSIUM CHLORIDE, SODIUM LACTATE AND CALCIUM CHLORIDE: 600; 310; 30; 20 INJECTION, SOLUTION INTRAVENOUS at 09:14

## 2023-08-21 RX ADMIN — HYDRALAZINE HYDROCHLORIDE 5 MG: 20 INJECTION INTRAMUSCULAR; INTRAVENOUS at 11:18

## 2023-08-21 RX ADMIN — SODIUM CHLORIDE, POTASSIUM CHLORIDE, SODIUM LACTATE AND CALCIUM CHLORIDE: 600; 310; 30; 20 INJECTION, SOLUTION INTRAVENOUS at 09:47

## 2023-08-21 RX ADMIN — Medication 0.25 MG: at 11:40

## 2023-08-21 RX ADMIN — PROPOFOL 200 MG: 10 INJECTION, EMULSION INTRAVENOUS at 09:52

## 2023-08-21 RX ADMIN — DEXAMETHASONE SODIUM PHOSPHATE 10 MG: 10 INJECTION, SOLUTION INTRAMUSCULAR; INTRAVENOUS at 09:58

## 2023-08-21 RX ADMIN — HYDROMORPHONE HYDROCHLORIDE 0.25 MG: 0.5 INJECTION, SOLUTION INTRAMUSCULAR; INTRAVENOUS; SUBCUTANEOUS at 11:22

## 2023-08-21 RX ADMIN — Medication 20 MG: at 10:41

## 2023-08-21 RX ADMIN — HYDROMORPHONE HYDROCHLORIDE 0.25 MG: 0.5 INJECTION, SOLUTION INTRAMUSCULAR; INTRAVENOUS; SUBCUTANEOUS at 11:17

## 2023-08-21 RX ADMIN — HYDRALAZINE HYDROCHLORIDE 5 MG: 20 INJECTION INTRAMUSCULAR; INTRAVENOUS at 11:13

## 2023-08-21 RX ADMIN — CEFAZOLIN 2000 MG: 2 INJECTION, POWDER, FOR SOLUTION INTRAMUSCULAR; INTRAVENOUS at 09:47

## 2023-08-21 RX ADMIN — HYDROMORPHONE HYDROCHLORIDE 0.25 MG: 1 INJECTION, SOLUTION INTRAMUSCULAR; INTRAVENOUS; SUBCUTANEOUS at 11:40

## 2023-08-21 RX ADMIN — FENTANYL CITRATE 50 MCG: 50 INJECTION, SOLUTION INTRAMUSCULAR; INTRAVENOUS at 10:10

## 2023-08-21 RX ADMIN — MIDAZOLAM 2 MG: 1 INJECTION INTRAMUSCULAR; INTRAVENOUS at 09:47

## 2023-08-21 RX ADMIN — FENTANYL CITRATE 50 MCG: 50 INJECTION, SOLUTION INTRAMUSCULAR; INTRAVENOUS at 09:52

## 2023-08-21 RX ADMIN — HYDRALAZINE HYDROCHLORIDE 5 MG: 20 INJECTION INTRAMUSCULAR; INTRAVENOUS at 11:03

## 2023-08-21 RX ADMIN — Medication 0.2 MG: at 10:41

## 2023-08-21 RX ADMIN — HYDRALAZINE HYDROCHLORIDE 5 MG: 20 INJECTION INTRAMUSCULAR; INTRAVENOUS at 11:15

## 2023-08-21 RX ADMIN — PROPOFOL 70 MG: 10 INJECTION, EMULSION INTRAVENOUS at 10:30

## 2023-08-21 RX ADMIN — IPRATROPIUM BROMIDE AND ALBUTEROL SULFATE 1 DOSE: 2.5; .5 SOLUTION RESPIRATORY (INHALATION) at 09:35

## 2023-08-21 RX ADMIN — ONDANSETRON 4 MG: 2 INJECTION INTRAMUSCULAR; INTRAVENOUS at 10:42

## 2023-08-21 RX ADMIN — OXYCODONE AND ACETAMINOPHEN 1 TABLET: 5; 325 TABLET ORAL at 12:40

## 2023-08-21 ASSESSMENT — PAIN DESCRIPTION - LOCATION
LOCATION: KNEE

## 2023-08-21 ASSESSMENT — PAIN DESCRIPTION - PAIN TYPE: TYPE: SURGICAL PAIN

## 2023-08-21 ASSESSMENT — PAIN DESCRIPTION - DESCRIPTORS
DESCRIPTORS: ACHING;DISCOMFORT
DESCRIPTORS: ACHING

## 2023-08-21 ASSESSMENT — PAIN SCALES - GENERAL
PAINLEVEL_OUTOF10: 4
PAINLEVEL_OUTOF10: 2
PAINLEVEL_OUTOF10: 6
PAINLEVEL_OUTOF10: 3
PAINLEVEL_OUTOF10: 6

## 2023-08-21 ASSESSMENT — PAIN DESCRIPTION - ORIENTATION
ORIENTATION: RIGHT

## 2023-08-21 ASSESSMENT — PAIN - FUNCTIONAL ASSESSMENT
PAIN_FUNCTIONAL_ASSESSMENT: 0-10
PAIN_FUNCTIONAL_ASSESSMENT: PREVENTS OR INTERFERES SOME ACTIVE ACTIVITIES AND ADLS

## 2023-08-21 NOTE — BRIEF OP NOTE
Brief Postoperative Note      Patient: Charlie Gottron  YOB: 1975  MRN: [id number]    Date of Procedure: 8/21/2023    Pre-Op Diagnosis Codes:     * Tear of meniscus of right knee [S83.206A]    Post-Op Diagnosis: {MH OR OCGA:[id number]}       Procedure(s):  RIGHT KNEE ARTHROSCOPY    Surgeon(s): Dorinda Frias DO    Assistant:  Resident: Latisha Krishnamurthy DO; Ellis Bernardo DO    Anesthesia: General    Estimated Blood Loss (mL): {NUMBERS; PMX:80459}    Complications: {Symptoms;  Intra-op complications:33418}    Specimens:   * No specimens in log *    Implants:  * No implants in log *      Drains: * No LDAs found *    Findings: ***      Electronically signed by Dorinda Frias DO on 8/21/2023 at 10:43 AM

## 2023-08-21 NOTE — OP NOTE
Operative Note      Patient: Hyun Cherry  YOB: 1975  MRN: [id number]    Date of Procedure: 8/21/2023    PREOPERATIVE DIAGNOSIS:  1. Right knee osteoarthritis  2. Right knee medial meniscus tear. 3. Right knee synovitis. POSTOPERATIVE DIAGNOSIS:  1. Right knee osteoarthritis  2. Right knee medial meniscus tear. 3. Right knee synovitis. TITLE OF OPERATION:   1. Right knee arthroscopic chondroplasty  2. Right knee arthroscopic synovectomy     Surgeon(s): Caleb Marx DO    Assistant:   Resident: Nolan Hager DO; Kelley Sutton DO    Anesthesia: General    Estimated Blood Loss (mL): 5    Complications: None    Specimens:   * No specimens in log *    Implants:  * No implants in log *      Drains: * No LDAs found *      Detailed Description of Procedure:      COMPLICATIONS:   None. INDICATIONS: This is a 46 y.o. male experiencing progressive pain, swelling, effusion, catching, mechanical symptoms and MRI with the above findings. He elects to undergo the above-stated procedure and understands the risks and benefits. Patient was treated conservatively for osteoarthritis with little to no relief. He was found to have a degenerative meniscal tear. PROCEDURE:  The patient was taken to the operating room, placed supine on the operating table, underwent general anesthesia without difficulty. The Right knee demonstrated negative Lachman, negative anterior-posterior drawer. No varus or valgus instability. The Right  leg was placed in a well-padded leg cole and prepped and draped in a sterile fashion. The arthroscope was inserted through the inferolateral portal instrumentation medially and outflow superolateral. Inspection of the patellofemoral joint demonstrated synovitis throughout medial and lateral gutter, intercondylar notch, treated with synovectomy with ArthroCare mechanical shaver.  Articular cartilage of the trochlea and the patella demonstrated grade 3 changes which was

## 2023-08-21 NOTE — H&P
I have reviewed the history and physical and examined the patient and find no relevant changes. I have reviewed with the patient and/or family the risks, benefits, and alternatives to the procedure. The patient was counseled at length about the risks of cecil Covid-19 during their perioperative period and any recovery window from their procedure. The patient was made aware that cecil Covid-19  may worsen their prognosis for recovering from their procedure  and lend to a higher morbidity and/or mortality risk. All material risks, benefits, and reasonable alternatives including postponing the procedure were discussed. The patient does wish to proceed with the procedure at this time.         Janes Petty, DO  8/21/2023

## 2023-08-21 NOTE — PROGRESS NOTES
CLINICAL PHARMACY NOTE: MEDS TO BEDS    Total # of Prescriptions Filled: 4   The following medications were delivered to the patient:  Aspirin 325 mg  Docusate 100 mg  Ondansetron 4 mg  Oxycodone 5-325 mg    Additional Documentation:

## 2023-08-22 NOTE — ANESTHESIA POSTPROCEDURE EVALUATION
Department of Anesthesiology  Postprocedure Note    Patient: Iban Redman  MRN: [id number]  YOB: 1975  Date of evaluation: 8/22/2023      Procedure Summary     Date: 08/21/23 Room / Location: [address] [place] [address] / [address] [address]    Anesthesia Start: [facility] Anesthesia Stop: 1059    Procedure: RIGHT KNEE ARTHROSCOPY (Right) Diagnosis:       Tear of meniscus of right knee      (Tear of meniscus of right knee [S83.206A])    Surgeons: Celena Salazar DO Responsible Provider: Arlene Pires MD    Anesthesia Type: general ASA Status: 3          Anesthesia Type: No value filed.     Ruben Phase I: Ruben Score: 10    Ruben Phase II: Ruben Score: 10      Anesthesia Post Evaluation    Patient location during evaluation: PACU  Patient participation: complete - patient participated  Level of consciousness: awake  Airway patency: patent  Nausea & Vomiting: no nausea and no vomiting  Complications: no  Cardiovascular status: hemodynamically stable  Respiratory status: acceptable  Hydration status: euvolemic  Pain management: adequate

## 2023-09-05 ENCOUNTER — OFFICE VISIT (OUTPATIENT)
Dept: ORTHOPEDIC SURGERY | Age: 48
End: 2023-09-05

## 2023-09-05 VITALS — TEMPERATURE: 98 F | WEIGHT: 219 LBS | HEIGHT: 68 IN | BODY MASS INDEX: 33.19 KG/M2

## 2023-09-05 DIAGNOSIS — S83.206A TEAR OF MENISCUS OF RIGHT KNEE, UNSPECIFIED MENISCUS, UNSPECIFIED TEAR TYPE, UNSPECIFIED WHETHER OLD OR CURRENT TEAR: Primary | ICD-10-CM

## 2023-09-05 DIAGNOSIS — M23.51 RECURRENT RIGHT KNEE INSTABILITY: ICD-10-CM

## 2023-09-05 PROCEDURE — 99024 POSTOP FOLLOW-UP VISIT: CPT | Performed by: ORTHOPAEDIC SURGERY

## 2023-09-05 NOTE — PROGRESS NOTES
Chief Complaint   Patient presents with    Knee Pain     Right knee arthroscopy DOS 8/21/23. Mostly complaining of weakness and stiffness in the knee. Pain in knee is not as bad. Overall walking on knee and getting around. Subjective:        Perry Zee is here for follow-up after right knee arthroscopy. Findings at surgery: medial meniscus tear, OA, synovitis. Pain is controlled without any medications. The patient denies fever, wound drainage, increasing redness, pus, increasing pain, increasing swelling. Post op problems reported: none. He is ambulating without crutches but has not started PT yet. Objective:           General :    alert, appears stated age, and cooperative   Gait:  Antalgic. Sutures:   Sutures out. Incision:  healing well, no significant drainage, no dehiscence, no significant erythema   Tenderness:  mild   Flexion ROM:  diminished range of motion   Extension ROM:  diminished range of motion   Effusion:  no   DVT Evaluation:  No evidence of DVT seen on physical exam.           Assessment:     Yulia Nair was seen today for knee pain. Diagnoses and all orders for this visit:    Tear of meniscus of right knee, unspecified meniscus, unspecified tear type, unspecified whether old or current tear  -     [facility], [address] [place] [address]    Recurrent right knee instability           Plan:      Surgical pictures from the surgery were reveiwed with the patient  Sutures removed today. Begin local wound cares. Wound care discussed. Range of motion and rehabilitation exercises discussed with the patient. Physical Therapy for post-operative rehabilitation. Full weight bearing. Follow up: 4 weeks.          Kathy Shallow, DO

## 2023-09-15 ENCOUNTER — TELEPHONE (OUTPATIENT)
Dept: ORTHOPEDIC SURGERY | Age: 48
End: 2023-09-15

## 2023-09-15 NOTE — TELEPHONE ENCOUNTER
Patient appointment scheduled 10/2/23 for right knee arthroscopy DOS 8/21/23, Physical therapy referral. Per doctors notes 4 week follow up. LOV 9/5/23. Patient requesting to come in sooner to be evaluated to be released to go back to work.  Please advise [phone]

## 2023-09-27 ENCOUNTER — OFFICE VISIT (OUTPATIENT)
Dept: ORTHOPEDIC SURGERY | Age: 48
End: 2023-09-27

## 2023-09-27 VITALS — HEIGHT: 68 IN | TEMPERATURE: 98 F | BODY MASS INDEX: 33.19 KG/M2 | WEIGHT: 219 LBS

## 2023-09-27 DIAGNOSIS — S83.206A TEAR OF MENISCUS OF RIGHT KNEE, UNSPECIFIED MENISCUS, UNSPECIFIED TEAR TYPE, UNSPECIFIED WHETHER OLD OR CURRENT TEAR: Primary | ICD-10-CM

## 2023-09-27 DIAGNOSIS — Z71.82 EXERCISE COUNSELING: ICD-10-CM

## 2023-09-27 DIAGNOSIS — M23.51 RECURRENT RIGHT KNEE INSTABILITY: ICD-10-CM

## 2023-09-27 NOTE — PROGRESS NOTES
Chief Complaint   Patient presents with    Knee Pain     Right Knee Arthroscopy DOS 08/21/2023           Subjective:        Juan M Frias is here for follow-up after right knee arthroscopy. Findings at surgery: medial meniscus tear, OA, synovitis. Pain is controlled without any medications. The patient denies fever, wound drainage, increasing redness, pus, increasing pain, increasing swelling. Post op problems reported: none. He is ambulating without crutches but has not started PT yet. Objective:           General :    alert, appears stated age, and cooperative   Gait:  Antalgic. Sutures:   Sutures out. Incision:  healing well, no significant drainage, no dehiscence, no significant erythema   Tenderness:  mild   Flexion ROM:  diminished range of motion   Extension ROM:  diminished range of motion   Effusion:  no   DVT Evaluation:  No evidence of DVT seen on physical exam.           Assessment:     Kelly York was seen today for knee pain. Diagnoses and all orders for this visit:    Tear of meniscus of right knee, unspecified meniscus, unspecified tear type, unspecified whether old or current tear    Recurrent right knee instability    Exercise counseling             Plan:     Surgical pictures from the surgery were reveiwed with the patient  Patient would like to return to work full duty at this time. He was counseled about return to work ability and potential along with physical therapy. Patient verbalized understanding. Begin local wound cares. Wound care discussed. Range of motion and rehabilitation exercises discussed with the patient. Physical Therapy for post-operative rehabilitation. Full weight bearing. Follow up: 6-8 weeks.          Chrissy Watson DO

## 2023-10-03 ENCOUNTER — TELEPHONE (OUTPATIENT)
Dept: PHYSICAL THERAPY | Age: 48
End: 2023-10-03

## 2023-10-03 NOTE — TELEPHONE ENCOUNTER
Date: 10/3/2023       Patient Name: Krystian Aldridge  : 1975      MRN: [id number]    No show/no call for PT evaluation scheduled at 0800 today.     Princeton Frankel, PT

## 2024-11-05 ENCOUNTER — TELEPHONE (OUTPATIENT)
Dept: SLEEP CENTER | Age: 49
End: 2024-11-05

## 2025-02-06 DIAGNOSIS — G47.33 OSA (OBSTRUCTIVE SLEEP APNEA): Primary | ICD-10-CM

## 2025-04-12 ENCOUNTER — APPOINTMENT (OUTPATIENT)
Dept: CT IMAGING | Age: 50
End: 2025-04-12
Payer: COMMERCIAL

## 2025-04-12 ENCOUNTER — APPOINTMENT (OUTPATIENT)
Dept: GENERAL RADIOLOGY | Age: 50
End: 2025-04-12
Payer: COMMERCIAL

## 2025-04-12 ENCOUNTER — HOSPITAL ENCOUNTER (EMERGENCY)
Age: 50
Discharge: HOME | End: 2025-04-12
Attending: EMERGENCY MEDICINE
Payer: COMMERCIAL

## 2025-04-12 VITALS
SYSTOLIC BLOOD PRESSURE: 178 MMHG | OXYGEN SATURATION: 100 % | BODY MASS INDEX: 33.95 KG/M2 | TEMPERATURE: 98.2 F | WEIGHT: 224 LBS | RESPIRATION RATE: 18 BRPM | DIASTOLIC BLOOD PRESSURE: 106 MMHG | HEIGHT: 68 IN | HEART RATE: 62 BPM

## 2025-04-12 DIAGNOSIS — R51.9 ACUTE NONINTRACTABLE HEADACHE, UNSPECIFIED HEADACHE TYPE: ICD-10-CM

## 2025-04-12 DIAGNOSIS — R07.89 ATYPICAL CHEST PAIN: ICD-10-CM

## 2025-04-12 DIAGNOSIS — R52 BODY ACHES: Primary | ICD-10-CM

## 2025-04-12 LAB
ALBUMIN SERPL-MCNC: 4.5 G/DL (ref 3.5–5.2)
ALP SERPL-CCNC: 74 U/L (ref 40–129)
ALT SERPL-CCNC: 26 U/L (ref 0–40)
ANION GAP SERPL CALCULATED.3IONS-SCNC: 12 MMOL/L (ref 7–16)
AST SERPL-CCNC: 27 U/L (ref 0–39)
BASOPHILS # BLD: 0.03 K/UL (ref 0–0.2)
BASOPHILS NFR BLD: 1 % (ref 0–2)
BILIRUB SERPL-MCNC: 1 MG/DL (ref 0–1.2)
BUN SERPL-MCNC: 15 MG/DL (ref 6–20)
CALCIUM SERPL-MCNC: 9.5 MG/DL (ref 8.6–10.2)
CHLORIDE SERPL-SCNC: 104 MMOL/L (ref 98–107)
CK SERPL-CCNC: 174 U/L (ref 20–200)
CO2 SERPL-SCNC: 25 MMOL/L (ref 22–29)
CREAT SERPL-MCNC: 0.8 MG/DL (ref 0.7–1.2)
EKG ATRIAL RATE: 61 BPM
EKG P AXIS: 51 DEGREES
EKG P-R INTERVAL: 162 MS
EKG Q-T INTERVAL: 406 MS
EKG QRS DURATION: 92 MS
EKG QTC CALCULATION (BAZETT): 408 MS
EKG R AXIS: 11 DEGREES
EKG T AXIS: 16 DEGREES
EKG VENTRICULAR RATE: 61 BPM
EOSINOPHIL # BLD: 0.23 K/UL (ref 0.05–0.5)
EOSINOPHILS RELATIVE PERCENT: 5 % (ref 0–6)
ERYTHROCYTE [DISTWIDTH] IN BLOOD BY AUTOMATED COUNT: 11.7 % (ref 11.5–15)
GFR, ESTIMATED: >90 ML/MIN/1.73M2
GLUCOSE SERPL-MCNC: 105 MG/DL (ref 74–99)
HCT VFR BLD AUTO: 44.4 % (ref 37–54)
HGB BLD-MCNC: 15.6 G/DL (ref 12.5–16.5)
IMM GRANULOCYTES # BLD AUTO: <0.03 K/UL (ref 0–0.58)
IMM GRANULOCYTES NFR BLD: 0 % (ref 0–5)
INR PPP: 0.9
LYMPHOCYTES NFR BLD: 1.46 K/UL (ref 1.5–4)
LYMPHOCYTES RELATIVE PERCENT: 32 % (ref 20–42)
MAGNESIUM SERPL-MCNC: 2.1 MG/DL (ref 1.6–2.6)
MCH RBC QN AUTO: 30.8 PG (ref 26–35)
MCHC RBC AUTO-ENTMCNC: 35.1 G/DL (ref 32–34.5)
MCV RBC AUTO: 87.7 FL (ref 80–99.9)
MONOCYTES NFR BLD: 0.42 K/UL (ref 0.1–0.95)
MONOCYTES NFR BLD: 9 % (ref 2–12)
NEUTROPHILS NFR BLD: 54 % (ref 43–80)
NEUTS SEG NFR BLD: 2.49 K/UL (ref 1.8–7.3)
PLATELET # BLD AUTO: 234 K/UL (ref 130–450)
PMV BLD AUTO: 11.3 FL (ref 7–12)
POTASSIUM SERPL-SCNC: 4.4 MMOL/L (ref 3.5–5)
PROT SERPL-MCNC: 7.1 G/DL (ref 6.4–8.3)
PROTHROMBIN TIME: 10.2 SEC (ref 9.3–12.4)
RBC # BLD AUTO: 5.06 M/UL (ref 3.8–5.8)
SODIUM SERPL-SCNC: 141 MMOL/L (ref 132–146)
TROPONIN I SERPL HS-MCNC: 7 NG/L (ref 0–11)
TROPONIN I SERPL HS-MCNC: 7 NG/L (ref 0–11)
WBC OTHER # BLD: 4.6 K/UL (ref 4.5–11.5)

## 2025-04-12 PROCEDURE — 2580000003 HC RX 258: Performed by: EMERGENCY MEDICINE

## 2025-04-12 PROCEDURE — 6360000002 HC RX W HCPCS: Performed by: EMERGENCY MEDICINE

## 2025-04-12 PROCEDURE — 84484 ASSAY OF TROPONIN QUANT: CPT

## 2025-04-12 PROCEDURE — 83735 ASSAY OF MAGNESIUM: CPT

## 2025-04-12 PROCEDURE — 85610 PROTHROMBIN TIME: CPT

## 2025-04-12 PROCEDURE — 85025 COMPLETE CBC W/AUTO DIFF WBC: CPT

## 2025-04-12 PROCEDURE — 70450 CT HEAD/BRAIN W/O DYE: CPT

## 2025-04-12 PROCEDURE — 93005 ELECTROCARDIOGRAM TRACING: CPT | Performed by: EMERGENCY MEDICINE

## 2025-04-12 PROCEDURE — 99285 EMERGENCY DEPT VISIT HI MDM: CPT

## 2025-04-12 PROCEDURE — 82550 ASSAY OF CK (CPK): CPT

## 2025-04-12 PROCEDURE — 96374 THER/PROPH/DIAG INJ IV PUSH: CPT

## 2025-04-12 PROCEDURE — 71046 X-RAY EXAM CHEST 2 VIEWS: CPT

## 2025-04-12 PROCEDURE — 80053 COMPREHEN METABOLIC PANEL: CPT

## 2025-04-12 RX ORDER — 0.9 % SODIUM CHLORIDE 0.9 %
1000 INTRAVENOUS SOLUTION INTRAVENOUS ONCE
Status: COMPLETED | OUTPATIENT
Start: 2025-04-12 | End: 2025-04-12

## 2025-04-12 RX ORDER — DEXAMETHASONE SODIUM PHOSPHATE 4 MG/ML
4 INJECTION, SOLUTION INTRA-ARTICULAR; INTRALESIONAL; INTRAMUSCULAR; INTRAVENOUS; SOFT TISSUE ONCE
Status: COMPLETED | OUTPATIENT
Start: 2025-04-12 | End: 2025-04-12

## 2025-04-12 RX ORDER — NAPROXEN 500 MG/1
500 TABLET ORAL 2 TIMES DAILY PRN
Qty: 60 TABLET | Refills: 0 | Status: SHIPPED | OUTPATIENT
Start: 2025-04-12

## 2025-04-12 RX ADMIN — DEXAMETHASONE SODIUM PHOSPHATE 4 MG: 4 INJECTION INTRA-ARTICULAR; INTRALESIONAL; INTRAMUSCULAR; INTRAVENOUS; SOFT TISSUE at 18:33

## 2025-04-12 RX ADMIN — SODIUM CHLORIDE 1000 ML: 0.9 INJECTION, SOLUTION INTRAVENOUS at 13:50

## 2025-04-12 ASSESSMENT — PAIN DESCRIPTION - LOCATION: LOCATION: HEAD

## 2025-04-12 ASSESSMENT — PAIN SCALES - GENERAL: PAINLEVEL_OUTOF10: 1

## 2025-04-12 ASSESSMENT — LIFESTYLE VARIABLES
HOW OFTEN DO YOU HAVE A DRINK CONTAINING ALCOHOL: 4 OR MORE TIMES A WEEK
HOW MANY STANDARD DRINKS CONTAINING ALCOHOL DO YOU HAVE ON A TYPICAL DAY: 5 OR 6

## 2025-04-12 NOTE — ED PROVIDER NOTES
included: a personal history and physicial examination    This patient has remained hemodynamically stable during their ED course.      Re-Evaluations:             Re-evaluation.  Patient’s symptoms are improving    Re-examination  4/12/25   1:23 PM EDT          Vital Signs:   Vitals:    04/12/25 1254 04/12/25 1255 04/12/25 1739   BP:  (!) 158/99 (!) 178/106   Pulse:  66 62   Resp:  18 18   Temp:  98.2 °F (36.8 °C)    SpO2:  95% 100%   Weight: 101.6 kg (224 lb)     Height: 1.727 m (5' 8\")             Counseling:   The emergency provider has spoken with the patient and discussed today’s results, in addition to providing specific details for the plan of care and counseling regarding the diagnosis and prognosis.  Questions are answered at this time and they are agreeable with the plan.       --------------------------------- IMPRESSION AND DISPOSITION ---------------------------------    IMPRESSION  1. Body aches    2. Acute nonintractable headache, unspecified headache type    3. Atypical chest pain        DISPOSITION  Disposition: Discharge to home  Patient condition is stable    NOTE: This report was transcribed using voice recognition software. Every effort was made to ensure accuracy; however, inadvertent computerized transcription errors may be present        Yonny Rivas MD  04/12/25 [id number]

## 2025-04-14 LAB
EKG ATRIAL RATE: 61 BPM
EKG P AXIS: 51 DEGREES
EKG P-R INTERVAL: 162 MS
EKG Q-T INTERVAL: 406 MS
EKG QRS DURATION: 92 MS
EKG QTC CALCULATION (BAZETT): 408 MS
EKG R AXIS: 11 DEGREES
EKG T AXIS: 16 DEGREES
EKG VENTRICULAR RATE: 61 BPM

## 2025-04-14 PROCEDURE — 93010 ELECTROCARDIOGRAM REPORT: CPT | Performed by: INTERNAL MEDICINE

## 2025-04-18 ENCOUNTER — TELEPHONE (OUTPATIENT)
Dept: CARDIOLOGY | Age: 50
End: 2025-04-18

## 2025-04-18 NOTE — TELEPHONE ENCOUNTER
Called pt parent and left message to schedule pt echo. Pt phone number not going through.    Electronically signed by Lisa Rodriguez on 4/18/2025 at 12:03 PM

## 2025-04-18 NOTE — TELEPHONE ENCOUNTER
Called pt to schedule echo. VM not working.    Electronically signed by Lisa Rodriguez on 4/18/2025 at 9:07 AM

## 2025-06-11 ENCOUNTER — TELEPHONE (OUTPATIENT)
Dept: CARDIOLOGY | Age: 50
End: 2025-06-11

## 2025-07-25 ENCOUNTER — TELEPHONE (OUTPATIENT)
Dept: CARDIOLOGY | Age: 50
End: 2025-07-25

## (undated) DEVICE — TUBING PMP L16FT MAIN DISP FOR AR-6400 AR-6475

## (undated) DEVICE — BASIC DOUBLE BASIN 2-LF: Brand: MEDLINE INDUSTRIES, INC.

## (undated) DEVICE — TUBING, SUCTION, 1/4" X 10', STRAIGHT: Brand: MEDLINE

## (undated) DEVICE — 4-PORT MANIFOLD: Brand: NEPTUNE 2

## (undated) DEVICE — WIPES SKIN CLOTH READYPREP 9 X 10.5 IN 2% CHLORHEX GLUCONATE CHG PREOP

## (undated) DEVICE — DRESSING HYDROFIBER AQUACEL AG ADVANTAGE 3.5X12 IN

## (undated) DEVICE — PACK,EXTREMITY,ORTHOMAX,5/CS: Brand: MEDLINE

## (undated) DEVICE — PAD,ABDOMINAL,8"X10",ST,LF: Brand: MEDLINE

## (undated) DEVICE — GOWN,SIRUS,POLYRNF,BRTHSLV,XLN/XXL,18/CS: Brand: MEDLINE

## (undated) DEVICE — MARKER,SKIN,WI/RULER AND LABELS: Brand: MEDLINE

## (undated) DEVICE — BANDAGE COMPR M W6INXL10YD WHT BGE VELC E MTRX HK AND LOOP

## (undated) DEVICE — SHEET,DRAPE,40X58,STERILE: Brand: MEDLINE

## (undated) DEVICE — NEEDLE HYPO 25GA L1.5IN BLU POLYPR HUB S STL REG BVL STR

## (undated) DEVICE — BANDAGE COMPR W6INXL12FT SMOOTH FOR LIMB EXSANG ESMARCH

## (undated) DEVICE — BLADE,STAINLESS-STEEL,11,STRL,DISPOSABLE: Brand: MEDLINE

## (undated) DEVICE — 3.75 MM INTEGRATED CABLE WAND ICW,                                    SUPER MULTIVAC 50 WITH INTEGRATED                                    FINGER SWITCHES IFS, 50 DEGREE: Brand: COBLATION

## (undated) DEVICE — BLADE SHVR AGRSV + RED BL 4.0MM

## (undated) DEVICE — GAUZE,SPONGE,4"X4",16PLY,STRL,LF,10/TRAY: Brand: MEDLINE

## (undated) DEVICE — GLOVE ORTHO 8   MSG9480

## (undated) DEVICE — GAUZE,SPONGE,4"X4",16PLY,XRAY,STRL,LF: Brand: MEDLINE

## (undated) DEVICE — PAD POS ARTHSCP DISP PIVOTPOST

## (undated) DEVICE — GOWN,SIRUS,POLYRNF,RAGLAN,XL,ST,30/CS: Brand: MEDLINE

## (undated) DEVICE — NEEDLE SPNL L3.5IN PNK HUB S STL REG WALL FIT STYL W/ QNCKE

## (undated) DEVICE — PADDING,UNDERCAST,COTTON, 4"X4YD STERILE: Brand: MEDLINE

## (undated) DEVICE — COVER,LIGHT HANDLE,FLX,1/PK: Brand: MEDLINE INDUSTRIES, INC.

## (undated) DEVICE — NDL CNTR 40CT FM MAG: Brand: MEDLINE INDUSTRIES, INC.

## (undated) DEVICE — TOWEL,OR,DSP,ST,BLUE,STD,6/PK,12PK/CS: Brand: MEDLINE

## (undated) DEVICE — APPLICATOR MEDICATED 26 CC SOLUTION HI LT ORNG CHLORAPREP

## (undated) DEVICE — 3M™ STERI-DRAPE™ U-DRAPE 1015: Brand: STERI-DRAPE™

## (undated) DEVICE — GARMENT,MEDLINE,DVT,INT,CALF,MED, GEN2: Brand: MEDLINE

## (undated) DEVICE — AMIENT MEGAVAC 90 WAND: Brand: COBLATION

## (undated) DEVICE — SOLUTION IRRIG 3000ML 0.9% SOD CHL USP UROMATIC PLAS CONT

## (undated) DEVICE — BLADE,STAINLESS-STEEL,15,STRL,DISPOSABLE: Brand: MEDLINE

## (undated) DEVICE — BANDAGE COMPR W6INXL5YD SELF ADH COHESIVE CO FLX

## (undated) DEVICE — SYRINGE MED 10ML LUERLOCK TIP W/O SFTY DISP

## (undated) DEVICE — GLOVE ORANGE PI 8   MSG9080

## (undated) DEVICE — HYPODERMIC SAFETY NEEDLE: Brand: MAGELLAN